# Patient Record
Sex: FEMALE | Race: OTHER | HISPANIC OR LATINO | URBAN - METROPOLITAN AREA
[De-identification: names, ages, dates, MRNs, and addresses within clinical notes are randomized per-mention and may not be internally consistent; named-entity substitution may affect disease eponyms.]

---

## 2018-12-29 ENCOUNTER — EMERGENCY (EMERGENCY)
Facility: HOSPITAL | Age: 28
LOS: 1 days | Discharge: HOME | End: 2018-12-29
Attending: EMERGENCY MEDICINE
Payer: COMMERCIAL

## 2018-12-29 VITALS
WEIGHT: 95.02 LBS | SYSTOLIC BLOOD PRESSURE: 110 MMHG | RESPIRATION RATE: 18 BRPM | DIASTOLIC BLOOD PRESSURE: 73 MMHG | TEMPERATURE: 99 F | OXYGEN SATURATION: 100 % | HEART RATE: 78 BPM | HEIGHT: 62 IN

## 2018-12-29 PROCEDURE — 99283 EMERGENCY DEPT VISIT LOW MDM: CPT

## 2018-12-29 PROCEDURE — 93010 ELECTROCARDIOGRAM REPORT: CPT

## 2018-12-29 PROCEDURE — 99284 EMERGENCY DEPT VISIT MOD MDM: CPT

## 2018-12-29 PROCEDURE — 93005 ELECTROCARDIOGRAM TRACING: CPT

## 2018-12-29 PROCEDURE — T1013: CPT

## 2018-12-29 NOTE — ED ADULT TRIAGE NOTE - CHIEF COMPLAINT QUOTE
Via , Pt saw blood in her right eye and then syncopized.   heard her fall. She woke up on the couch and vomited.  Recently finished 10 days of medroxyprogesterone and started menstruating on Wed. Alert and oriented X 3.  Denies injury, denies pain.

## 2018-12-29 NOTE — ED PROVIDER NOTE - MEDICAL DECISION MAKING DETAILS
likelyt vasovagal syncope no h.o early sudden cardiac death in family or exertiaonal syncoep no signs trauma ambulating in nad nml neuro exam

## 2018-12-29 NOTE — ED PROVIDER NOTE - OBJECTIVE STATEMENT
A 28 year old female pt with no PMHx presents to the ED s/p syncopal episode. The pt went into the bathroom and noticed a subconjunctival hematoma when she experienced a brief syncopal episode. The pt did not hit her head and did not have any CP, SOB. No Positive PERC criteria. No hx of exertional syncope or early sudden cardiac death in family. denies fever. denies HA or neck pain. no abd pain. no n/v/d. no urinary f/u/d. no back pain. no motor or sensory deficits. denies illicit drug use. no recent travel. no rash. no other acute issues symptoms or concerns

## 2018-12-29 NOTE — ED ADULT NURSE NOTE - OBJECTIVE STATEMENT
pt with reports of passing out after seeing blood in her eye. pt then vomited on the couch. pt with no SOB, no chest pain, no dizziness, no complaints on arrival. pt AOX3. ambulatory. no distress.

## 2018-12-29 NOTE — ED ADULT NURSE NOTE - NSIMPLEMENTINTERV_GEN_ALL_ED
Implemented All Universal Safety Interventions:  [place] to call system. Call bell, personal items and telephone within reach. Instruct patient to call for assistance. Room bathroom lighting operational. Non-slip footwear when patient is off stretcher. Physically safe environment: no spills, clutter or unnecessary equipment. Stretcher in lowest position, wheels locked, appropriate side rails in place.

## 2023-12-06 PROBLEM — Z00.00 ENCOUNTER FOR PREVENTIVE HEALTH EXAMINATION: Status: ACTIVE | Noted: 2023-12-06

## 2023-12-11 ENCOUNTER — APPOINTMENT (OUTPATIENT)
Dept: CARDIOLOGY | Facility: CLINIC | Age: 33
End: 2023-12-11
Payer: COMMERCIAL

## 2023-12-11 ENCOUNTER — NON-APPOINTMENT (OUTPATIENT)
Age: 33
End: 2023-12-11

## 2023-12-11 VITALS — DIASTOLIC BLOOD PRESSURE: 74 MMHG | SYSTOLIC BLOOD PRESSURE: 109 MMHG | OXYGEN SATURATION: 100 % | HEART RATE: 79 BPM

## 2023-12-11 VITALS — WEIGHT: 110 LBS | HEIGHT: 62 IN | BODY MASS INDEX: 20.24 KG/M2

## 2023-12-11 DIAGNOSIS — Z78.9 OTHER SPECIFIED HEALTH STATUS: ICD-10-CM

## 2023-12-11 DIAGNOSIS — Z86.32 PERSONAL HISTORY OF GESTATIONAL DIABETES: ICD-10-CM

## 2023-12-11 PROCEDURE — 93000 ELECTROCARDIOGRAM COMPLETE: CPT

## 2023-12-11 PROCEDURE — 99203 OFFICE O/P NEW LOW 30 MIN: CPT | Mod: 25

## 2023-12-11 RX ORDER — ACETAMINOPHEN 325 MG
TABLET ORAL
Refills: 0 | Status: ACTIVE | COMMUNITY

## 2023-12-11 RX ORDER — BACILLUS COAGULANS/INULIN 1B-250 MG
CAPSULE ORAL
Refills: 0 | Status: ACTIVE | COMMUNITY

## 2023-12-11 RX ORDER — PRENATAL VIT 15/IRON/FOLIC/DSS 90-1-50 MG
TABLET ORAL
Refills: 0 | Status: ACTIVE | COMMUNITY

## 2024-01-26 ENCOUNTER — APPOINTMENT (OUTPATIENT)
Dept: CARDIOLOGY | Facility: CLINIC | Age: 34
End: 2024-01-26
Payer: COMMERCIAL

## 2024-01-26 PROCEDURE — 93306 TTE W/DOPPLER COMPLETE: CPT

## 2024-02-05 ENCOUNTER — APPOINTMENT (OUTPATIENT)
Dept: CARDIOLOGY | Facility: CLINIC | Age: 34
End: 2024-02-05
Payer: COMMERCIAL

## 2024-02-05 PROCEDURE — 93015 CV STRESS TEST SUPVJ I&R: CPT

## 2024-02-07 NOTE — ADDENDUM
[FreeTextEntry1] : Echocardiogram January 26, 2024: LVEF 60 to 65%.  Normal echocardiogram  Regular stress test February 5, 2024: Negative stress test with a good exercise capacity.  Patient is low risk for perioperative cardiac events from the EGD and colonoscopy.  NO ABSOLUTE CONTRAINDICATIONS TO PROCEED WITH THE EGD/COLONOSCOPY.

## 2024-02-07 NOTE — HISTORY OF PRESENT ILLNESS
[FreeTextEntry1] : HPI: Patient is a 33-year-old  female who presents for cardiac evaluation because of symptoms of palpitations and she also needs a clearance for EGD and colonoscopy.  Patient is having episodes of palpitations on average once a week, lasting for few minutes.  Patient denies any associated chest tightness, syncope, dyspnea.  Episodes of palpitations are nonexertional, there are no definite precipitating or relieving factors.     PMH: No known medical problems  Social History:Non-smoker.  Denies any alcohol or substance abuse.  Family history: Noncontributory

## 2024-02-07 NOTE — ASSESSMENT
[FreeTextEntry1] : EKG:  December 11, 2023: Normal sinus rhythm.  Normal ECG   Assessment: 1.  Intermittent palpitations 2.  Preop cardiac risk assessment prior to EGD and colonoscopy  Recommendations: 1.  Echocardiogram and regular stress test 2.  30-day monitor MCOT 3.  Follow-up in 2 months  Patient's perioperative cardiac risk assessment stress test.  For EGD and colonoscopy to be addressed after the

## 2024-02-12 ENCOUNTER — APPOINTMENT (OUTPATIENT)
Dept: CARDIOLOGY | Facility: CLINIC | Age: 34
End: 2024-02-12
Payer: COMMERCIAL

## 2024-02-12 VITALS — BODY MASS INDEX: 19.69 KG/M2 | WEIGHT: 107 LBS | HEIGHT: 62 IN

## 2024-02-12 VITALS — SYSTOLIC BLOOD PRESSURE: 108 MMHG | DIASTOLIC BLOOD PRESSURE: 74 MMHG | HEART RATE: 68 BPM | OXYGEN SATURATION: 98 %

## 2024-02-12 DIAGNOSIS — Z01.810 ENCOUNTER FOR PREPROCEDURAL CARDIOVASCULAR EXAMINATION: ICD-10-CM

## 2024-02-12 DIAGNOSIS — R00.2 PALPITATIONS: ICD-10-CM

## 2024-02-12 PROCEDURE — 99213 OFFICE O/P EST LOW 20 MIN: CPT

## 2024-02-16 NOTE — ASSESSMENT
[FreeTextEntry1] : EKG:  December 11, 2023: Normal sinus rhythm.  Normal ECG  Echocardiogram January 26, 2024: LVEF 60 to 65%. Normal echocardiogram  Regular stress test February 5, 2024: Negative stress test with a good exercise capacity. Event monitor: No arrhythmias  Assessment: 1.  Intermittent palpitations- Resolved normal echo normal stress test, unremarkable event monitor 2.  Preop cardiac risk assessment prior to EGD and colonoscopy  Recommendations: All the test results reviewed with the patient F/u PRN  Patient is low risk for perioperative cardiac events from the EGD and colonoscopy.  NO ABSOLUTE CONTRAINDICATIONS TO PROCEED WITH THE EGD/COLONOSCOPY.

## 2024-02-16 NOTE — HISTORY OF PRESENT ILLNESS
[FreeTextEntry1] : HPI: Patient is a 33-year-old  female who presents for cardiac evaluation because of symptoms of palpitations and she also needs a clearance for EGD and colonoscopy.  Patient is having episodes of palpitations on average once a week, lasting for few minutes.  Patient denies any associated chest tightness, syncope, dyspnea.  Episodes of palpitations are nonexertional, there are no definite precipitating or relieving factors.  Today patient presents for a follow-up visit.  Patient denies any chest pain palpitations.  She had stress test echo and event monitor.   PMH: No known medical problems  Social History:Non-smoker.  Denies any alcohol or substance abuse.  Family history: Noncontributory

## 2024-09-18 NOTE — ED PROVIDER NOTE - MUSCULOSKELETAL NEGATIVE STATEMENT, MLM
----- Message from Vimal Cohen sent at 9/18/2024  8:13 AM CDT -----  Regarding: Appt  Contact: [phone]  Missed Callback         Pt returning callback from missed call. Requesting to speak with somebody in   office regarding coming in earlier for appt.. Please call [phone]  
Spoke to pt.  Rescheduled her appt to this morning.    
no back pain, no musculoskeletal pain, no neck pain, and no weakness.